# Patient Record
(demographics unavailable — no encounter records)

---

## 2025-02-20 NOTE — HISTORY OF PRESENT ILLNESS
[FreeTextEntry1] : np eczema [de-identified] : Referred by: Dr. Cristobal Marquez   Ms. TASHA OLIVO  is a 6 month old F here for evaluation of below  #eczema on skin - tried triamcinolone cream with some improvement S: eucerin M: eucerin D: babyganics FF, no fs/ds/wb/fb

## 2025-02-20 NOTE — CONSULT LETTER
[Dear  ___] : Dear  [unfilled], [Consult Letter:] : I had the pleasure of evaluating your patient, [unfilled]. [Please see my note below.] : Please see my note below. [Consult Closing:] : Thank you very much for allowing me to participate in the care of this patient.  If you have any questions, please do not hesitate to contact me. [Sincerely,] : Sincerely, [FreeTextEntry3] : Amalia Frazier MD Department of Dermatology [facility]

## 2025-02-20 NOTE — PHYSICAL EXAM
[FreeTextEntry3] : eczematous plaques and hypopigmented patches diffusely on trunk, extremities 1 eczematous patch on L cheek PIH nevus simplex L eyelid, glabella, upper lip

## 2025-02-20 NOTE — CONSULT LETTER
[Dear  ___] : Dear  [unfilled], [Consult Letter:] : I had the pleasure of evaluating your patient, [unfilled]. [Please see my note below.] : Please see my note below. [Consult Closing:] : Thank you very much for allowing me to participate in the care of this patient.  If you have any questions, please do not hesitate to contact me. [Sincerely,] : Sincerely, [FreeTextEntry3] : Amalia Frazier MD Department of Dermatology [facility]

## 2025-02-20 NOTE — HISTORY OF PRESENT ILLNESS
[FreeTextEntry1] : np eczema [de-identified] : Referred by: Dr. Cristobal Marquez   Ms. TASHA OLIVO  is a 6 month old F here for evaluation of below  #eczema on skin - tried triamcinolone cream with some improvement S: eucerin M: eucerin D: babyganics FF, no fs/ds/wb/fb

## 2025-02-20 NOTE — ASSESSMENT
[FreeTextEntry1] : # Atopic dermatitis, flared with #xerosis discussed nature, chronicity and unpredictable course Reviewed dry skin care, including bathing routines, emollients, and fragrance-free products. - Advised moisturizing w/ plain Vaseline. - start triamcinolone 0.1% ointment BID to AA on body PRN roughness.  - start hydrocortisone 2.5% ointment BID to AA on face PRN roughness.  - Reviewed SE of topical steroids including skin thinning and discoloration and discussed avoidance of prolonged use. - start dilute bleach baths. Instructions provided. - start wet wraps qHS. Instructions provided. - fragrance-free diapers.   RTC 2 weeks  nevus simplex - Counseled about clinically and dermatoscopically benign lesions - No treatment indicated at this time - Counseled patient to notify us of any changes

## 2025-03-12 NOTE — CONSULT LETTER
[Dear  ___] : Dear  [unfilled], [Consult Letter:] : I had the pleasure of evaluating your patient, [unfilled]. [Please see my note below.] : Please see my note below. [Consult Closing:] : Thank you very much for allowing me to participate in the care of this patient.  If you have any questions, please do not hesitate to contact me. [Sincerely,] : Sincerely, [FreeTextEntry3] : Amalia Frazier MD Department of Dermatology [facility]

## 2025-03-12 NOTE — HISTORY OF PRESENT ILLNESS
[FreeTextEntry1] : fp eczema [de-identified] : Referred by: Dr. Cristobal Marquez   Ms. TASHA OLIVO  is a 6 month old F here for evaluation of below  #eczema on skin  improving w/ bb, wet wraps, - occ still has small dry spots that come out. switched to cerave products  hx- tried triamcinolone cream with some improvement - S: eucerin M: eucerin D: babyganics FF, no fs/ds/wb/fb

## 2025-03-12 NOTE — HISTORY OF PRESENT ILLNESS
[FreeTextEntry1] : fp eczema [de-identified] : Referred by: Dr. Cristobal Marquez   Ms. TASHA OLIVO  is a 6 month old F here for evaluation of below  #eczema on skin  improving w/ bb, wet wraps, - occ still has small dry spots that come out. switched to cerave products  hx- tried triamcinolone cream with some improvement - S: eucerin M: eucerin D: babyganics FF, no fs/ds/wb/fb

## 2025-03-12 NOTE — CONSULT LETTER
[Dear  ___] : Dear  [unfilled], [Consult Letter:] : I had the pleasure of evaluating your patient, [unfilled]. [Please see my note below.] : Please see my note below. [Consult Closing:] : Thank you very much for allowing me to participate in the care of this patient.  If you have any questions, please do not hesitate to contact me. [Sincerely,] : Sincerely, [FreeTextEntry3] : Amalia Frazier MD Department of Dermatology [facility]

## 2025-03-12 NOTE — PHYSICAL EXAM
[FreeTextEntry3] : few thin eczematous patches and hypopigmented patches on trunk, extremities nevus simplex L eyelid, glabella, upper lip

## 2025-03-12 NOTE — ASSESSMENT
[Use of independent historian: [ enter independent historian's relationship to patient ] :____] : As the patient was unable to provide a complete and reliable history, I obtained clinical history from the patient's [unfilled] [FreeTextEntry1] : # Atopic dermatitis, improved but still active/flaring, not at treatment goal with #xerosis discussed nature, chronicity and unpredictable course Reviewed dry skin care, including bathing routines, emollients, and fragrance-free products. - Advised moisturizing w/ plain Vaseline. - triamcinolone 0.1% ointment BID to AA on body PRN roughness.  - hydrocortisone 2.5% ointment BID to AA on face PRN roughness.  - Reviewed SE of topical steroids including skin thinning and discoloration and discussed avoidance of prolonged use. - dilute bleach baths BIW for maintenance. Instructions provided. - fragrance-free diapers.   RTC  4 mo  nevus simplex - Counseled about clinically and dermatoscopically benign lesions - No treatment indicated at this time - Counseled patient to notify us of any changes  IH R post ankle